# Patient Record
Sex: FEMALE | Race: WHITE | NOT HISPANIC OR LATINO | Employment: PART TIME | URBAN - METROPOLITAN AREA
[De-identification: names, ages, dates, MRNs, and addresses within clinical notes are randomized per-mention and may not be internally consistent; named-entity substitution may affect disease eponyms.]

---

## 2024-03-14 ENCOUNTER — HOSPITAL ENCOUNTER (OUTPATIENT)
Dept: ULTRASOUND IMAGING | Facility: MEDICAL CENTER | Age: 59
Discharge: HOME | End: 2024-03-14
Payer: COMMERCIAL

## 2024-03-14 ENCOUNTER — HOSPITAL ENCOUNTER (OUTPATIENT)
Dept: MAMMOGRAPHY | Facility: MEDICAL CENTER | Age: 59
Discharge: HOME | End: 2024-03-14
Payer: COMMERCIAL

## 2024-03-14 VITALS — WEIGHT: 149 LBS | HEIGHT: 64 IN | BODY MASS INDEX: 25.44 KG/M2

## 2024-03-14 DIAGNOSIS — E04.2 NONTOXIC MULTINODULAR GOITER: ICD-10-CM

## 2024-03-14 DIAGNOSIS — Z12.31 ENCOUNTER FOR SCREENING MAMMOGRAM FOR MALIGNANT NEOPLASM OF BREAST: ICD-10-CM

## 2024-03-14 PROCEDURE — 76536 US EXAM OF HEAD AND NECK: CPT

## 2024-03-14 PROCEDURE — 77067 SCR MAMMO BI INCL CAD: CPT

## 2024-03-14 PROCEDURE — 77063 BREAST TOMOSYNTHESIS BI: CPT

## 2024-12-05 ENCOUNTER — HOSPITAL ENCOUNTER (OUTPATIENT)
Dept: RADIOLOGY | Facility: HOSPITAL | Age: 59
Discharge: HOME | End: 2024-12-05
Payer: COMMERCIAL

## 2024-12-05 DIAGNOSIS — R92.30 DENSE BREASTS: ICD-10-CM

## 2024-12-05 PROCEDURE — C8908 MRI W/O FOL W/CONT, BREAST,: HCPCS

## 2024-12-05 PROCEDURE — C8937 CAD BREAST MRI: HCPCS

## 2024-12-05 PROCEDURE — A9585 GADOBUTROL INJECTION: HCPCS | Performed by: RADIOLOGY

## 2024-12-05 RX ORDER — GADOBUTROL 604.72 MG/ML
6 INJECTION INTRAVENOUS
Status: COMPLETED | OUTPATIENT
Start: 2024-12-05 | End: 2024-12-05

## 2024-12-05 RX ADMIN — GADOBUTROL 6 ML: 604.72 INJECTION INTRAVENOUS at 18:47

## 2025-01-17 ENCOUNTER — HOSPITAL ENCOUNTER (OUTPATIENT)
Dept: RADIOLOGY | Facility: HOSPITAL | Age: 60
Discharge: HOME | End: 2025-01-17
Payer: COMMERCIAL

## 2025-01-17 DIAGNOSIS — R92.8 ABNORMAL MRI, BREAST: ICD-10-CM

## 2025-01-17 PROCEDURE — 88305 TISSUE EXAM BY PATHOLOGIST: CPT | Performed by: STUDENT IN AN ORGANIZED HEALTH CARE EDUCATION/TRAINING PROGRAM

## 2025-01-17 PROCEDURE — A9585 GADOBUTROL INJECTION: HCPCS | Performed by: RADIOLOGY

## 2025-01-17 PROCEDURE — 19085 BX BREAST 1ST LESION MR IMAG: CPT

## 2025-01-17 PROCEDURE — A4648 IMPLANTABLE TISSUE MARKER: HCPCS

## 2025-01-17 RX ORDER — GADOBUTROL 604.72 MG/ML
7 INJECTION INTRAVENOUS
Status: COMPLETED | OUTPATIENT
Start: 2025-01-17 | End: 2025-01-17

## 2025-01-17 RX ORDER — LIDOCAINE HYDROCHLORIDE 10 MG/ML
5 INJECTION, SOLUTION EPIDURAL; INFILTRATION; INTRACAUDAL; PERINEURAL ONCE
Status: COMPLETED | OUTPATIENT
Start: 2025-01-17 | End: 2025-01-17

## 2025-01-17 RX ORDER — LIDOCAINE HYDROCHLORIDE AND EPINEPHRINE BITARTRATE 20; .01 MG/ML; MG/ML
20 INJECTION, SOLUTION SUBCUTANEOUS ONCE
Status: COMPLETED | OUTPATIENT
Start: 2025-01-17 | End: 2025-01-17

## 2025-01-17 RX ADMIN — LIDOCAINE HYDROCHLORIDE 5 ML: 10 INJECTION, SOLUTION EPIDURAL; INFILTRATION; INTRACAUDAL; PERINEURAL at 12:27

## 2025-01-17 RX ADMIN — GADOBUTROL 7 ML: 604.72 INJECTION INTRAVENOUS at 11:17

## 2025-01-17 RX ADMIN — LIDOCAINE HYDROCHLORIDE,EPINEPHRINE BITARTRATE 20 ML: 20; .01 INJECTION, SOLUTION INFILTRATION; PERINEURAL at 12:28

## 2025-01-20 PROCEDURE — 88305 TISSUE EXAM BY PATHOLOGIST: CPT | Performed by: STUDENT IN AN ORGANIZED HEALTH CARE EDUCATION/TRAINING PROGRAM

## 2025-01-20 NOTE — PROGRESS NOTES
Post procedure call completed    Bleeding: _____yes __X___no (pt denies)    Pain: _____yes ___X___no (pt denies, used ice, no need for OTC pain meds)    Redness/Swelling: ______yes ___X___no (pt denies)    Band aid removed: _____yes ___X__no (discussed removing when she showers)    Steri-Strips intact: ___X___yes _____no (discussed with patient to remove steri strips on Wednesday if they have not come off on their own)    Pt with no questions at this time, adv will call when results available, adv to call with any questions or concerns, has name/# for contact

## 2025-01-21 ENCOUNTER — TELEPHONE (OUTPATIENT)
Dept: MAMMOGRAPHY | Facility: CLINIC | Age: 60
End: 2025-01-21

## 2025-06-05 ENCOUNTER — HOSPITAL ENCOUNTER (OUTPATIENT)
Dept: MAMMOGRAPHY | Facility: MEDICAL CENTER | Age: 60
Discharge: HOME | End: 2025-06-05
Payer: COMMERCIAL

## 2025-06-05 VITALS — WEIGHT: 159 LBS | BODY MASS INDEX: 27.14 KG/M2 | HEIGHT: 64 IN

## 2025-06-05 DIAGNOSIS — Z12.31 ENCOUNTER FOR SCREENING MAMMOGRAM FOR MALIGNANT NEOPLASM OF BREAST: ICD-10-CM

## 2025-06-05 PROCEDURE — 77063 BREAST TOMOSYNTHESIS BI: CPT

## 2025-06-05 PROCEDURE — 77067 SCR MAMMO BI INCL CAD: CPT

## 2025-07-28 ENCOUNTER — HOSPITAL ENCOUNTER (OUTPATIENT)
Dept: ULTRASOUND IMAGING | Facility: MEDICAL CENTER | Age: 60
Discharge: HOME | End: 2025-07-28
Attending: FAMILY MEDICINE
Payer: COMMERCIAL

## 2025-07-28 DIAGNOSIS — E04.1 NONTOXIC SINGLE THYROID NODULE: ICD-10-CM

## 2025-07-28 PROCEDURE — 76536 US EXAM OF HEAD AND NECK: CPT
